# Patient Record
Sex: MALE
[De-identification: names, ages, dates, MRNs, and addresses within clinical notes are randomized per-mention and may not be internally consistent; named-entity substitution may affect disease eponyms.]

---

## 2017-08-01 ENCOUNTER — HOSPITAL ENCOUNTER (OUTPATIENT)
Dept: HOSPITAL 57 - BURLAB | Age: 62
Discharge: HOME | End: 2017-08-01
Attending: FAMILY MEDICINE
Payer: COMMERCIAL

## 2017-08-01 DIAGNOSIS — Z00.00: Primary | ICD-10-CM

## 2017-08-01 LAB
25(OH)D3+25(OH)D2 SERPL-MCNC: 41.6 NG/ML (ref 30–?)
ALBUMIN SERPL BCG-MCNC: 4 G/DL (ref 3.4–4.8)
ALP SERPL-CCNC: 65 U/L (ref 40–150)
ALT SERPL W P-5'-P-CCNC: 23 U/L (ref 8–55)
ANION GAP SERPL CALC-SCNC: 14 MMOL/L (ref 10–20)
AST SERPL-CCNC: 22 U/L (ref 5–34)
BASOPHILS # BLD AUTO: 0.1 THOU/UL (ref 0–0.2)
BASOPHILS NFR BLD AUTO: 0.8 % (ref 0–1)
BILIRUB SERPL-MCNC: 0.8 MG/DL (ref 0.2–1.2)
BUN SERPL-MCNC: 16 MG/DL (ref 8.4–25.7)
CALCIUM SERPL-MCNC: 9.5 MG/DL (ref 7.8–10.44)
CHD RISK SERPL-RTO: 3.2 (ref ?–4.5)
CHLORIDE SERPL-SCNC: 106 MMOL/L (ref 98–107)
CHOLEST SERPL-MCNC: 132 MG/DL
CO2 SERPL-SCNC: 26 MMOL/L (ref 23–31)
CREAT CL PREDICTED SERPL C-G-VRATE: 0 ML/MIN (ref 70–130)
EOSINOPHIL # BLD AUTO: 0.6 THOU/UL (ref 0–0.7)
EOSINOPHIL NFR BLD AUTO: 6.6 % (ref 0–10)
GLOBULIN SER CALC-MCNC: 3.2 G/DL (ref 2.4–3.5)
GLUCOSE SERPL-MCNC: 100 MG/DL (ref 80–115)
HDLC SERPL-MCNC: 41 MG/DL
HGB BLD-MCNC: 16.1 G/DL (ref 14–18)
LDLC SERPL CALC-MCNC: 75 MG/DL
LYMPHOCYTES # BLD AUTO: 1.8 THOU/UL (ref 1.2–3.4)
LYMPHOCYTES NFR BLD AUTO: 19.5 % (ref 21–51)
MCH RBC QN AUTO: 29.4 PG (ref 27–31)
MCV RBC AUTO: 89.6 FL (ref 80–94)
MONOCYTES # BLD AUTO: 0.6 THOU/UL (ref 0.11–0.59)
MONOCYTES NFR BLD AUTO: 6.1 % (ref 0–10)
NEUTROPHILS # BLD AUTO: 6.2 THOU/UL (ref 1.4–6.5)
NEUTROPHILS NFR BLD AUTO: 67 % (ref 42–75)
PLATELET # BLD AUTO: 217 THOU/UL (ref 130–400)
POTASSIUM SERPL-SCNC: 4.7 MMOL/L (ref 3.5–5.1)
PSA SERPL DL<=0.01 NG/ML-MCNC: 0.94 NG/ML (ref 0–4)
RBC # BLD AUTO: 5.48 MILL/UL (ref 4.7–6.1)
SODIUM SERPL-SCNC: 141 MMOL/L (ref 136–145)
TRIGL SERPL-MCNC: 78 MG/DL (ref ?–150)
TSH SERPL DL<=0.005 MIU/L-ACNC: 0.97 UIU/ML (ref 0.35–4.94)
WBC # BLD AUTO: 9.3 THOU/UL (ref 4.8–10.8)

## 2017-08-01 PROCEDURE — 80061 LIPID PANEL: CPT

## 2017-08-01 PROCEDURE — 36415 COLL VENOUS BLD VENIPUNCTURE: CPT

## 2017-08-01 PROCEDURE — 84443 ASSAY THYROID STIM HORMONE: CPT

## 2017-08-01 PROCEDURE — G0103 PSA SCREENING: HCPCS

## 2017-08-01 PROCEDURE — 83036 HEMOGLOBIN GLYCOSYLATED A1C: CPT

## 2017-08-01 PROCEDURE — 82306 VITAMIN D 25 HYDROXY: CPT

## 2017-08-01 PROCEDURE — 80053 COMPREHEN METABOLIC PANEL: CPT

## 2017-08-01 PROCEDURE — 85025 COMPLETE CBC W/AUTO DIFF WBC: CPT

## 2018-05-16 ENCOUNTER — HOSPITAL ENCOUNTER (OUTPATIENT)
Dept: HOSPITAL 57 - BURRAD | Age: 63
Discharge: HOME | End: 2018-05-16
Attending: PHYSICIAN ASSISTANT
Payer: COMMERCIAL

## 2018-05-16 DIAGNOSIS — M25.551: Primary | ICD-10-CM

## 2018-05-16 NOTE — RAD
RADIOGRAPH RIGHT HIP 2 VIEWS:

5/16/18

 

HISTORY: 

62-year-old male with nontraumatic right hip pain, M25.551.

 

FINDINGS:  

Femoral head contour is maintained. Hip joint space is maintained. No  subcapital osteophytes. No lar
ge acetabular osteophytes. No destructive osseous lesion.

 

IMPRESSION:  

Negative. 

 

 

 

POS: [facility]

## 2018-06-08 ENCOUNTER — HOSPITAL ENCOUNTER (OUTPATIENT)
Dept: HOSPITAL 57 - BURRAD | Age: 63
Discharge: HOME | End: 2018-06-08
Attending: PHYSICIAN ASSISTANT
Payer: COMMERCIAL

## 2018-06-08 DIAGNOSIS — M79.1: Primary | ICD-10-CM

## 2018-06-08 PROCEDURE — 71046 X-RAY EXAM CHEST 2 VIEWS: CPT

## 2018-06-08 NOTE — RAD
CHEST

6/8/18

 

PA and lateral views are compared with a 12/15/15 study.

 

The heart is minimally enlarged and unchanged in size. A bipolar cardiac pacer remains in place. Ther
e are no congestive changes, pleural effusions or focal pulmonary infiltrates. The bony structures sh
owed no acute change.

 

IMPRESSION:  

No acute thoracic finding. 

 

POS: HOME

## 2019-03-27 ENCOUNTER — HOSPITAL ENCOUNTER (OUTPATIENT)
Dept: HOSPITAL 92 - SDC | Age: 64
Discharge: HOME | End: 2019-03-27
Attending: INTERNAL MEDICINE
Payer: COMMERCIAL

## 2019-03-27 VITALS — BODY MASS INDEX: 38.9 KG/M2

## 2019-03-27 DIAGNOSIS — Z79.899: ICD-10-CM

## 2019-03-27 DIAGNOSIS — K63.5: ICD-10-CM

## 2019-03-27 DIAGNOSIS — Z87.891: ICD-10-CM

## 2019-03-27 DIAGNOSIS — Z91.048: ICD-10-CM

## 2019-03-27 DIAGNOSIS — Z12.11: Primary | ICD-10-CM

## 2019-03-27 DIAGNOSIS — Z79.82: ICD-10-CM

## 2019-03-27 DIAGNOSIS — K57.30: ICD-10-CM

## 2019-03-27 DIAGNOSIS — I49.5: ICD-10-CM

## 2019-03-27 DIAGNOSIS — Z95.0: ICD-10-CM

## 2019-03-27 DIAGNOSIS — J44.9: ICD-10-CM

## 2019-03-27 DIAGNOSIS — Z86.010: ICD-10-CM

## 2019-03-27 DIAGNOSIS — Z80.0: ICD-10-CM

## 2019-03-27 PROCEDURE — 0DBM8ZX EXCISION OF DESCENDING COLON, VIA NATURAL OR ARTIFICIAL OPENING ENDOSCOPIC, DIAGNOSTIC: ICD-10-PCS | Performed by: INTERNAL MEDICINE

## 2019-03-27 PROCEDURE — 0DBK8ZX EXCISION OF ASCENDING COLON, VIA NATURAL OR ARTIFICIAL OPENING ENDOSCOPIC, DIAGNOSTIC: ICD-10-PCS | Performed by: INTERNAL MEDICINE

## 2019-03-27 PROCEDURE — 88305 TISSUE EXAM BY PATHOLOGIST: CPT

## 2019-03-27 NOTE — OP
DATE OF PROCEDURE:  03/27/2019



PREPROCEDURE DIAGNOSES:  Personal history of colon polyps.  Family history of colon

cancer in father. 



PROCEDURES:  Colonoscopy and polypectomy.



ANESTHESIA:  TIVA.



RECOMMENDATIONS:  Repeat colonoscopy in 5 years.



POSTPROCEDURE DIAGNOSES:  Three small polyps, one in the ascending and two in the

descending. 



DESCRIPTION OF PROCEDURE:  The patient was informed of the risks, benefits, possible

complications of endoscopy including perforation, reaction to medication, and

aspiration.  Informed consent was obtained.  The patient was brought to the

endoscopy suite, where he was sedated in gradual fashion.  Once he was comfortable,

rectal exam was performed.  The endoscope was advanced through the anal canal

through the colon to the cecum, which was identified by ileocecal valve and

appendiceal orifice.  The scope was then slowly removed.  The prep was good.  There

was a 4 mm polyp, sessile in the ascending colon, removed by hot snare polypectomy

and 2 diminutive polyps in the descending colon, removed by hot snare polypectomy.

Retroflexed views in the rectum were normal.  There were a few small diverticula.

Conclusion of procedure, the scope was removed, the patient tolerated the procedure

well. 



He did have some mild laryngospasm, which was treated by anesthesia, see their

records for details, but had no significant desaturation.  The patient tolerated the

procedure well.  There were no complications. 







Job ID:  [id number]

## 2019-03-28 ENCOUNTER — HOSPITAL ENCOUNTER (OUTPATIENT)
Dept: HOSPITAL 57 - BURRAD | Age: 64
Discharge: HOME | End: 2019-03-28
Attending: PHYSICIAN ASSISTANT
Payer: COMMERCIAL

## 2019-03-28 DIAGNOSIS — R50.81: Primary | ICD-10-CM

## 2019-03-28 DIAGNOSIS — J98.4: ICD-10-CM

## 2019-03-28 DIAGNOSIS — R05: ICD-10-CM

## 2019-03-28 PROCEDURE — 71046 X-RAY EXAM CHEST 2 VIEWS: CPT

## 2019-03-28 NOTE — RAD
CHEST TWO VIEWS:

3/28/19

 

Comparison is made with the 6/8/18 study. 

 

Mild cardiomegaly is about the same and a cardiac pacer remains in place. There is no vascular conges
tion, edema, or pleural effusion. 

 

On the lateral view, there seems to be a little more streaking behind the heart than there was on the
 prior exam. Because of this, I cannot exclude a minimal infiltrate here. The lungs are slightly hype
rexpanded as usual. The upper lobes are clear. 

 

IMPRESSION:  

Slight retrocardiac streaking. Given the clinical history, it might be safest to cover the patient wi
th antibiotics in case the minimal increased retrocardiac haziness is actually a small infiltrate. 

 

POS: HOME

## 2019-09-22 ENCOUNTER — HOSPITAL ENCOUNTER (OUTPATIENT)
Dept: HOSPITAL 92 - ERS | Age: 64
Setting detail: OBSERVATION
LOS: 1 days | Discharge: HOME | End: 2019-09-23
Attending: HOSPITALIST | Admitting: HOSPITALIST
Payer: COMMERCIAL

## 2019-09-22 VITALS — BODY MASS INDEX: 36.8 KG/M2

## 2019-09-22 DIAGNOSIS — E66.9: ICD-10-CM

## 2019-09-22 DIAGNOSIS — I49.5: ICD-10-CM

## 2019-09-22 DIAGNOSIS — J44.9: ICD-10-CM

## 2019-09-22 DIAGNOSIS — Z95.0: ICD-10-CM

## 2019-09-22 DIAGNOSIS — I47.1: Primary | ICD-10-CM

## 2019-09-22 DIAGNOSIS — Z91.048: ICD-10-CM

## 2019-09-22 DIAGNOSIS — Z79.899: ICD-10-CM

## 2019-09-22 LAB
ALBUMIN SERPL BCG-MCNC: 4.3 G/DL (ref 3.4–4.8)
ALP SERPL-CCNC: 71 U/L (ref 40–150)
ALT SERPL W P-5'-P-CCNC: 20 U/L (ref 8–55)
ANION GAP SERPL CALC-SCNC: 11 MMOL/L (ref 10–20)
AST SERPL-CCNC: 32 U/L (ref 5–34)
BASOPHILS # BLD AUTO: 0.1 THOU/UL (ref 0–0.2)
BASOPHILS NFR BLD AUTO: 1 % (ref 0–1)
BILIRUB SERPL-MCNC: 0.5 MG/DL (ref 0.2–1.2)
BUN SERPL-MCNC: 24 MG/DL (ref 8.4–25.7)
CALCIUM SERPL-MCNC: 9.4 MG/DL (ref 7.8–10.44)
CHLORIDE SERPL-SCNC: 107 MMOL/L (ref 98–107)
CK MB SERPL-MCNC: 6.3 NG/ML (ref 0–6.6)
CO2 SERPL-SCNC: 23 MMOL/L (ref 23–31)
CREAT CL PREDICTED SERPL C-G-VRATE: 0 ML/MIN (ref 70–130)
EOSINOPHIL # BLD AUTO: 0.2 THOU/UL (ref 0–0.7)
EOSINOPHIL NFR BLD AUTO: 2.5 % (ref 0–10)
GLOBULIN SER CALC-MCNC: 2.9 G/DL (ref 2.4–3.5)
GLUCOSE SERPL-MCNC: 96 MG/DL (ref 80–115)
HGB BLD-MCNC: 15.2 G/DL (ref 14–18)
LYMPHOCYTES # BLD: 1.9 THOU/UL (ref 1.2–3.4)
LYMPHOCYTES NFR BLD AUTO: 22 % (ref 21–51)
MCH RBC QN AUTO: 31 PG (ref 27–31)
MCV RBC AUTO: 91.7 FL (ref 78–98)
MONOCYTES # BLD AUTO: 0.5 THOU/UL (ref 0.11–0.59)
MONOCYTES NFR BLD AUTO: 5.8 % (ref 0–10)
NEUTROPHILS # BLD AUTO: 5.9 THOU/UL (ref 1.4–6.5)
NEUTROPHILS NFR BLD AUTO: 68.7 % (ref 42–75)
PLATELET # BLD AUTO: 202 THOU/UL (ref 130–400)
POTASSIUM SERPL-SCNC: 4.2 MMOL/L (ref 3.5–5.1)
RBC # BLD AUTO: 4.89 MILL/UL (ref 4.7–6.1)
SODIUM SERPL-SCNC: 137 MMOL/L (ref 136–145)
TROPONIN I SERPL DL<=0.01 NG/ML-MCNC: 0.02 NG/ML (ref ?–0.03)
TROPONIN I SERPL DL<=0.01 NG/ML-MCNC: 0.03 NG/ML (ref ?–0.03)
TROPONIN I SERPL DL<=0.01 NG/ML-MCNC: 0.04 NG/ML (ref ?–0.03)
WBC # BLD AUTO: 8.6 THOU/UL (ref 4.8–10.8)

## 2019-09-22 PROCEDURE — 71045 X-RAY EXAM CHEST 1 VIEW: CPT

## 2019-09-22 PROCEDURE — 36415 COLL VENOUS BLD VENIPUNCTURE: CPT

## 2019-09-22 PROCEDURE — 93005 ELECTROCARDIOGRAM TRACING: CPT

## 2019-09-22 PROCEDURE — 84484 ASSAY OF TROPONIN QUANT: CPT

## 2019-09-22 PROCEDURE — 82553 CREATINE MB FRACTION: CPT

## 2019-09-22 PROCEDURE — 80048 BASIC METABOLIC PNL TOTAL CA: CPT

## 2019-09-22 PROCEDURE — 85025 COMPLETE CBC W/AUTO DIFF WBC: CPT

## 2019-09-22 PROCEDURE — 83880 ASSAY OF NATRIURETIC PEPTIDE: CPT

## 2019-09-22 PROCEDURE — 90471 IMMUNIZATION ADMIN: CPT

## 2019-09-22 PROCEDURE — 80053 COMPREHEN METABOLIC PANEL: CPT

## 2019-09-22 PROCEDURE — G0009 ADMIN PNEUMOCOCCAL VACCINE: HCPCS

## 2019-09-22 PROCEDURE — G0378 HOSPITAL OBSERVATION PER HR: HCPCS

## 2019-09-22 PROCEDURE — 90670 PCV13 VACCINE IM: CPT

## 2019-09-22 NOTE — RAD
EXAM:

Portable chest



PROVIDED CLINICAL HISTORY:

Tachycardia



COMPARISON:

3/28/2019



FINDINGS:

Cardiac and mediastinal silhouette is unchanged in appearance. Left subclavian cardiac pacing device 
is redemonstrated. No focal consolidation, pleural fluid or pneumothorax evident.



IMPRESSION:

No evidence for an acute cardiopulmonary process.



Reported By: Hakeem Verde 

Electronically Signed:  9/22/2019 7:59 AM

## 2019-09-22 NOTE — HP
PRIMARY CARE PROVIDER:  HARPER Lao, in [place].



CHIEF COMPLAINT:  Tachycardia.



HISTORY OF PRESENT ILLNESS:  Mr. Segovia is a 63-year-old man with past medical

history of sick sinus syndrome, status post pacemaker, and COPD, who had 
presented

to the ED late last night after he experienced a fast heart rate after he was

outside working in the yard.  He states that he wears [employer] that had picked up

a heart rate of about 177 yesterday, the patient states that he had felt fine

without any acute symptoms and felt better after drinking some water inside.  He

states that his cardiologist is Dr. Valdivia.  He had denied any fever, chills, 
any

headache, blurred vision, dizziness, any chest pain, palpitations, shortness of

breath, abdominal pain, nausea, or vomiting.  Once he arrived to the ED, it was

found that he was in normal sinus rhythm, and his blood pressure and other vital

signs remained stable.  He was given aspirin and transferred up to the telemetry

floor for further monitoring.  His pacemaker was also checked and was found to 
be

working appropriately.  He was found to go in episodes of SVT with rates in the

160s.  However, pacemaker kicked in and slowed the heart rate down to a more 
stable

level.  Otherwise, the patient was asymptomatic and had no further symptoms. 



REVIEW OF SYSTEMS:  All other systems reviewed and found to be negative unless

mentioned in the HPI. 



PAST MEDICAL HISTORY:  Sick sinus syndrome and COPD.



PAST SURGICAL HISTORY:  Pacemaker placement.



PSYCHIATRIC HISTORY:  None.



SOCIAL HISTORY:  The patient reports drinking socially roughly 1 to 2 times a 
month

and denies any drug use or tobacco use. 



KNOWN ALLERGIES:  None.



CURRENT HOME MEDICATIONS:  None.



PHYSICAL EXAMINATION:

VITAL SIGNS:  Blood pressure 125/70, pulse 63, respirations 17, temperature 97.5
,

and O2 saturation 99% on room air. 

GENERAL:  The patient is awake, alert, and oriented x3.  He is currently lying

comfortably in bed and in no acute distress.  His family is at bedside. 

HEENT:  Atraumatic and normocephalic.  Pupils are round and reactive to light.

Extraocular muscles intact.  Moist mucous membranes noted. 

NECK:  Soft and supple.  Trachea midline. 

CARDIOVASCULAR:  Positive S1 and S2.  Regular rate and rhythm.  No murmur

auscultated. 

RESPIRATORY:  Clear to auscultation bilaterally.  No wheezes, rales, or 
rhonchi. 

ABDOMEN:  Soft, nontender.  Bowel sounds present. 

EXTREMITIES:  Moves all extremities equal.  Pedal and radial pulses 2+ 
bilaterally.

No edema noted. 

NEUROLOGIC:  Cranial nerves 2 through 12 grossly intact.  No focal deficits 
noted.

Speech intact and normal.  Gait not assessed. 

SKIN:  Warm, dry, and intact.  No rashes.  No ulceration noted. 

PSYCHIATRIC:  Good mood and affect.



LABORATORY DATA:  WBC 8.6, RBC 4.89, hemoglobin 15.2, and platelets 202.  Sodium

137, potassium 4.2, anion gap 11, BUN 24, creatinine 1.17, estimated GFR 63, CK-
MB

6.3.  Troponin 0.034, 0.040, 0.030, and 0.021. 



DIAGNOSTIC IMAGING:  Portable chest x-ray showed no evidence for an acute

cardiopulmonary process. 



ASSESSMENT AND PLAN:  

1. Tachycardia, the patient's pacemaker is found to be working appropriately.  
He

will be monitored on telemetry floor for any further signs of tachycardia or any

dysrhythmia.  A consult was also placed for Cardiology Services for further

evaluation.  Indeterminate troponin.  His cardiac enzymes were found to be

indeterminate, but have now trended down to normal.  He is currently 
asymptomatic at

this time. 

2. History of sick sinus syndrome, status post pacemaker.  This appears to be

working appropriately. 

3. Deep venous thrombosis and gastrointestinal prophylaxis.

4. Code status, full code.

5. Surrogate decision maker is his wife, Briana.



DISPOSITION:  Pending further workup and clinical findings, the patient will 
likely

be discharged home tomorrow if he is stable. 







Job ID:  [id number]



[facility]

## 2019-09-23 VITALS — TEMPERATURE: 97.3 F | SYSTOLIC BLOOD PRESSURE: 119 MMHG | DIASTOLIC BLOOD PRESSURE: 56 MMHG

## 2019-09-23 LAB
ANION GAP SERPL CALC-SCNC: 8 MMOL/L (ref 10–20)
BASOPHILS # BLD AUTO: 0 THOU/UL (ref 0–0.2)
BASOPHILS NFR BLD AUTO: 0.4 % (ref 0–1)
BUN SERPL-MCNC: 18 MG/DL (ref 8.4–25.7)
CALCIUM SERPL-MCNC: 8.7 MG/DL (ref 7.8–10.44)
CHLORIDE SERPL-SCNC: 106 MMOL/L (ref 98–107)
CO2 SERPL-SCNC: 29 MMOL/L (ref 23–31)
CREAT CL PREDICTED SERPL C-G-VRATE: 145 ML/MIN (ref 70–130)
EOSINOPHIL # BLD AUTO: 0.5 THOU/UL (ref 0–0.7)
EOSINOPHIL NFR BLD AUTO: 6.9 % (ref 0–10)
GLUCOSE SERPL-MCNC: 85 MG/DL (ref 80–115)
HGB BLD-MCNC: 14.6 G/DL (ref 14–18)
LYMPHOCYTES # BLD: 2 THOU/UL (ref 1.2–3.4)
LYMPHOCYTES NFR BLD AUTO: 29.6 % (ref 21–51)
MCH RBC QN AUTO: 31.3 PG (ref 27–31)
MCV RBC AUTO: 92.4 FL (ref 78–98)
MONOCYTES # BLD AUTO: 0.6 THOU/UL (ref 0.11–0.59)
MONOCYTES NFR BLD AUTO: 8.4 % (ref 0–10)
NEUTROPHILS # BLD AUTO: 3.8 THOU/UL (ref 1.4–6.5)
NEUTROPHILS NFR BLD AUTO: 54.7 % (ref 42–75)
PLATELET # BLD AUTO: 191 THOU/UL (ref 130–400)
POTASSIUM SERPL-SCNC: 3.6 MMOL/L (ref 3.5–5.1)
RBC # BLD AUTO: 4.66 MILL/UL (ref 4.7–6.1)
SODIUM SERPL-SCNC: 139 MMOL/L (ref 136–145)
WBC # BLD AUTO: 6.9 THOU/UL (ref 4.8–10.8)

## 2019-09-23 NOTE — CON
DATE OF CONSULTATION:  09/22/2019



INDICATION FOR CONSULTATION:  A 63-year-old gentleman who was admitted with 
episodes

of tachycardia.  This gentleman had a history of sick-sinus syndrome in the past

with tachy-warren syndrome and underwent pacemaker insertion.  He has been 
followed

by Dr. Valdivia.  He had been on metoprolol in the past, but wanted to stop taking

this medication.  He would be doing very well without the medicine and it was

agreed, then he stopped the medication.  He does not really want to take any

medications and he was out yesterday working in the yard.  He was doing some

significant extreme labor, underwent heavy lifting and cutting the grass and 
moving

logs.  He then went back inside.  He was doing quite well, but then when he 
went to

bed, knows his heart rate seemed to be little bit fast.  He took his heart rates
, it

is in the 100s.  He presented to the emergency room, where actually his heart 
rate

was increased up to 115 at times according to his monitor on his watch and then 
he

was admitted to the hospital.  His cardiac enzymes are also slightly increased, 
but

they are indeterminate at 0.034, increased up to 0.04, and is now back down to 
0.02.

 He denied any chest pain or shortness of breath.  No nausea.  No other symptoms

associated with tachycardia. Most likely, the elevation of cardiac enzymes were 
due

to the tachycardia.  At this time, he remains very stable and he has had no 
further

episodes.  The interrogation of the pacemaker did show he had  several high

ventricular rates.  He did have 1 episode of what appears to be SVT yesterday, 
but

no other episodes of SVT were noted and otherwise he has been doing very well 
and is

very stable with a pacemaker, which still has about 5 to 6 years left on the

generator prior to elective replacement interval.  Otherwise, the pacemaker 
function

was normal.  He has normal sensing and normal thresholds.  He is pacing in the

atrium about 84% of the time and has ventricular pacing less than 1% of the 
time.

He denies any symptoms, and at this time, is stable. 



PAST MEDICAL HISTORY:  Significant for sick-sinus syndrome and pacemaker 
insertion.



MEDICATIONS:  He is not on any medications.  He is on no significant 
medications and

no prescription medications. 



ALLERGIES:  NONE.



FAMILY HISTORY:  Noncontributory.



REVIEW OF SYSTEMS:  A 12-point review of systems is unremarkable except as 
noted in

history of present illness. 



PHYSICAL EXAMINATION:

GENERAL:  Reveals a very pleasant, well-developed, well-nourished gentleman, 
who is

in no acute distress at this time.  He is alert. He is oriented. 

VITAL SIGNS:  Blood pressure 130/70, heart rate is in the 60s.  He has atrial 
pacing

with ventricular sensing.  Respiratory rate is 20.  He is afebrile. 

HEENT:  Unremarkable.  He has normal carotids.  No bruits were noted. 

CHEST: Clear to auscultation without rales, rhonchi, or wheezing. 

CARDIOVASCULAR: Reveals a regular rhythm.  There were no significant murmurs,

heaves, thrills, bruits, or rubs.  He has a well-healed surgical incision of the

pacemaker site.  Otherwise, there were no other significant abnormalities. 

ABDOMEN:  Shows obesity with positive bowel sounds.  No organomegaly or masses

noted.  Femoral pulses are present. 

EXTREMITIES:  No clubbing, cyanosis, or edema.  Pedal pulses are present. 

NEUROLOGIC: The patient is fully intact with normal strength, normal tone. 

SKIN:  Warm and dry.



LABORATORY DATA:  Shows as above with a troponin I.  Otherwise, laboratory data 
is

unremarkable.  His potassium was 4.2, creatinine 1.17. Hemoglobin 15.2 and WBC 
of

8.6. 



IMPRESSION:  

1. Episode of supraventricular tachycardia yesterday in a patient, who has 
history

of sick-sinus syndrome, who does not desire to take medications.  I did suggest 
we

could put him on a low dose of beta blocker or diltiazem and he is not 
agreeable to

being on either one of these medications.  He does have an appointment to see 
Dr. Valdivia next month and I suggested that since he has had no further episodes here
, he

is relatively asymptomatic that he is most likely in good condition to be 
discharged

to home and can follow up with Dr. Valdivia next month and can make further 
decisions

about whether or not to be seen by the electrophysiologist even, but this 
appear to

be a short episode less than 20 minutes of SVT. 

2. Obesity.  He has been trying to diet.  He does take a protein shake every day
,

but otherwise has been dieting and every other day, tries to do some fasting 
and I

have advised him against doing this, but otherwise he seems to be doing quite 
well

and had no other cardiac problems and appears otherwise to be healthy.  At this

time, I have no further recommendations for this patient.  It is unlikely that 
he

would need to undergo stress testing. This can also be done as an outpatient if 
Dr. Valdivia feels that this would be indicated. 

I would think that his cardiac enzymes, since they were only still indeterminate
,

have now trended back downwards, is due to his heavy exertion yesterday and 
with his

tachycardia associated with SVT and during the exercise  and working, it 
appears he

has had just some sinus tachycardia. 







Job ID:  [id number]



[facility]

## 2019-09-24 NOTE — DIS
DATE OF ADMISSION:  09/22/2019



DATE OF DISCHARGE:  09/23/2019



DISCHARGE DIAGNOSES:  

1. Supraventricular tachycardia.

2. History of sick sinus syndrome, status post pacemaker placement.

3. Obesity.



HISTORY OF PRESENT ILLNESS:  The patient is a 63-year-old male with a history of

sick sinus syndrome for which he had a pacemaker placed.  The patient was previously

started on beta blockers in order to limit his tachycardic element of the sick sinus

syndrome, and he did not tolerate it well as it was keeping his heart rate too low

and he was feeling sluggish and not functional.  Therefore, he had discontinued it.

The patient was generally doing well.  He was outside working and his smart watch

told him that he was having tachycardia with a heart rate of 170s.  He presented to

the hospital where he was still a little tachycardic, but improved.  His labs were

notable for troponin of 0.034.  The patient was entirely asymptomatic. 



HOSPITAL COURSE:  The patient was placed on observation.  He had serial cardiac

isoenzymes.  His troponins basically stayed flat or trended slightly downward.  He

would remain asymptomatic.  He had no further tachycardia, his heart rate generally

stayed around 60.  He had his pacemaker interrogated and essentially it appears as

though it had captured the tachycardic episode and paced him out of it, as it was

programmed to do.  He was seen by Cardiology, who talked to him about considering

beta blockers or calcium channel blockers.  However, the patient was not interested

in pursuing those again at this time.  With that, the patient was felt to be stable

for discharge to home. 



PHYSICAL EXAMINATION:

VITAL SIGNS:  His temperature is 97.3, pulse 60, respirations 15, O2 saturation 98%

on room air, /56. 

GENERAL:  He is awake and alert. 

HEART:  Regular rate and rhythm without murmurs, gallops, or rubs. 

LUNGS:  Clear bilaterally. 

ABDOMEN:  Soft, nontender, and nondistended. EXTREMITIES:  No edema.



DISPOSITION:  Discharged home in stable condition.



DISCHARGE INSTRUCTIONS:  

1. He will continue with his usual home medications.  

2. He will have no new prescriptions.

3. His activity is as tolerated.  

4. He will stay on a heart healthy diet.



FOLLOWUP:  

1. He can follow up with Charline San in [place].

2. Dr. Valdivia in about 7 days.

3. He can return to the hospital should he feel the need to do so.







Job ID:  [id number]